# Patient Record
Sex: MALE | Race: WHITE | NOT HISPANIC OR LATINO | Employment: FULL TIME | URBAN - METROPOLITAN AREA
[De-identification: names, ages, dates, MRNs, and addresses within clinical notes are randomized per-mention and may not be internally consistent; named-entity substitution may affect disease eponyms.]

---

## 2018-04-24 ENCOUNTER — OFFICE VISIT CONVERTED (OUTPATIENT)
Dept: SURGERY | Facility: CLINIC | Age: 38
End: 2018-04-24
Attending: PHYSICIAN ASSISTANT

## 2019-09-10 ENCOUNTER — HOSPITAL ENCOUNTER (OUTPATIENT)
Dept: MRI IMAGING | Facility: HOSPITAL | Age: 39
Discharge: HOME | End: 2019-09-10
Attending: INTERNAL MEDICINE

## 2019-09-18 ENCOUNTER — OFFICE VISIT CONVERTED (OUTPATIENT)
Dept: SURGERY | Facility: CLINIC | Age: 39
End: 2019-09-18
Attending: SURGERY

## 2019-10-04 ENCOUNTER — HOSPITAL ENCOUNTER (OUTPATIENT)
Dept: GASTROENTEROLOGY | Facility: HOSPITAL | Age: 39
Setting detail: HOSPITAL OUTPATIENT SURGERY
Discharge: HOME | End: 2019-10-04
Attending: SURGERY

## 2019-10-16 ENCOUNTER — OFFICE VISIT CONVERTED (OUTPATIENT)
Dept: SURGERY | Facility: CLINIC | Age: 39
End: 2019-10-16
Attending: SURGERY

## 2019-10-16 ENCOUNTER — CONVERSION ENCOUNTER (OUTPATIENT)
Dept: SURGERY | Facility: CLINIC | Age: 39
End: 2019-10-16

## 2019-10-21 ENCOUNTER — HOSPITAL ENCOUNTER (OUTPATIENT)
Dept: ULTRASOUND IMAGING | Facility: HOSPITAL | Age: 39
Discharge: HOME | End: 2019-10-21
Attending: SURGERY

## 2019-10-23 ENCOUNTER — OFFICE VISIT CONVERTED (OUTPATIENT)
Dept: SURGERY | Facility: CLINIC | Age: 39
End: 2019-10-23
Attending: SURGERY

## 2019-10-23 ENCOUNTER — CONVERSION ENCOUNTER (OUTPATIENT)
Dept: SURGERY | Facility: CLINIC | Age: 39
End: 2019-10-23

## 2019-11-01 ENCOUNTER — HOSPITAL ENCOUNTER (OUTPATIENT)
Dept: PERIOP | Facility: HOSPITAL | Age: 39
Setting detail: HOSPITAL OUTPATIENT SURGERY
Discharge: HOME | End: 2019-11-01
Attending: SURGERY

## 2019-11-04 ENCOUNTER — HOSPITAL ENCOUNTER (OUTPATIENT)
Dept: OTHER | Facility: HOSPITAL | Age: 39
Discharge: HOME | End: 2019-11-04
Attending: FAMILY MEDICINE

## 2019-11-04 LAB
ALBUMIN SERPL-MCNC: 4.3 G/DL (ref 3.5–5)
ALBUMIN/GLOB SERPL: 1.6 {RATIO} (ref 1.4–2.6)
ALP SERPL-CCNC: 48 U/L (ref 53–128)
ALT SERPL-CCNC: 17 U/L (ref 10–40)
ANION GAP SERPL CALC-SCNC: 14 MMOL/L (ref 8–19)
AST SERPL-CCNC: 14 U/L (ref 15–50)
BASOPHILS # BLD AUTO: 0.06 10*3/UL (ref 0–0.2)
BASOPHILS NFR BLD AUTO: 0.8 % (ref 0–3)
BILIRUB SERPL-MCNC: 0.44 MG/DL (ref 0.2–1.3)
BUN SERPL-MCNC: 15 MG/DL (ref 5–25)
BUN/CREAT SERPL: 18 {RATIO} (ref 6–20)
CALCIUM SERPL-MCNC: 9.8 MG/DL (ref 8.7–10.4)
CHLORIDE SERPL-SCNC: 99 MMOL/L (ref 99–111)
CONV ABS IMM GRAN: 0.02 10*3/UL (ref 0–0.2)
CONV CO2: 27 MMOL/L (ref 22–32)
CONV IMMATURE GRAN: 0.3 % (ref 0–1.8)
CONV TOTAL PROTEIN: 7 G/DL (ref 6.3–8.2)
CORTIS PM SERPL-MCNC: 7.1 UG/DL (ref 2.7–10.5)
CREAT UR-MCNC: 0.85 MG/DL (ref 0.7–1.2)
DEPRECATED RDW RBC AUTO: 44.3 FL (ref 35.1–43.9)
EOSINOPHIL # BLD AUTO: 0.22 10*3/UL (ref 0–0.7)
EOSINOPHIL # BLD AUTO: 2.9 % (ref 0–7)
ERYTHROCYTE [DISTWIDTH] IN BLOOD BY AUTOMATED COUNT: 13.7 % (ref 11.6–14.4)
GFR SERPLBLD BASED ON 1.73 SQ M-ARVRAT: >60 ML/MIN/{1.73_M2}
GLOBULIN UR ELPH-MCNC: 2.7 G/DL (ref 2–3.5)
GLUCOSE SERPL-MCNC: 82 MG/DL (ref 70–99)
HCT VFR BLD AUTO: 42.7 % (ref 42–52)
HGB BLD-MCNC: 13.6 G/DL (ref 14–18)
LYMPHOCYTES # BLD AUTO: 2.51 10*3/UL (ref 1–5)
LYMPHOCYTES NFR BLD AUTO: 33.3 % (ref 20–45)
MCH RBC QN AUTO: 28 PG (ref 27–31)
MCHC RBC AUTO-ENTMCNC: 31.9 G/DL (ref 33–37)
MCV RBC AUTO: 87.9 FL (ref 80–96)
MONOCYTES # BLD AUTO: 0.82 10*3/UL (ref 0.2–1.2)
MONOCYTES NFR BLD AUTO: 10.9 % (ref 3–10)
NEUTROPHILS # BLD AUTO: 3.9 10*3/UL (ref 2–8)
NEUTROPHILS NFR BLD AUTO: 51.8 % (ref 30–85)
NRBC CBCN: 0 % (ref 0–0.7)
OSMOLALITY SERPL CALC.SUM OF ELEC: 282 MOSM/KG (ref 273–304)
PLATELET # BLD AUTO: 260 10*3/UL (ref 130–400)
PMV BLD AUTO: 10 FL (ref 9.4–12.4)
POTASSIUM SERPL-SCNC: 4.4 MMOL/L (ref 3.5–5.3)
RBC # BLD AUTO: 4.86 10*6/UL (ref 4.7–6.1)
SODIUM SERPL-SCNC: 136 MMOL/L (ref 135–147)
TSH SERPL-ACNC: 1.97 M[IU]/L (ref 0.27–4.2)
WBC # BLD AUTO: 7.53 10*3/UL (ref 4.8–10.8)

## 2019-11-05 ENCOUNTER — HOSPITAL ENCOUNTER (OUTPATIENT)
Dept: OTHER | Facility: HOSPITAL | Age: 39
Discharge: HOME | End: 2019-11-05
Attending: FAMILY MEDICINE

## 2019-11-05 LAB — CORTIS AM PEAK SERPL-MCNC: 14.2 UG/DL (ref 6–18.4)

## 2019-11-14 ENCOUNTER — CONVERSION ENCOUNTER (OUTPATIENT)
Dept: SURGERY | Facility: CLINIC | Age: 39
End: 2019-11-14

## 2019-11-14 ENCOUNTER — OFFICE VISIT CONVERTED (OUTPATIENT)
Dept: SURGERY | Facility: CLINIC | Age: 39
End: 2019-11-14
Attending: SURGERY

## 2020-07-06 ENCOUNTER — HOSPITAL ENCOUNTER (OUTPATIENT)
Dept: FAMILY MEDICINE CLINIC | Facility: CLINIC | Age: 40
Discharge: HOME | End: 2020-07-06
Attending: FAMILY MEDICINE

## 2020-09-14 ENCOUNTER — HOSPITAL ENCOUNTER (OUTPATIENT)
Dept: ULTRASOUND IMAGING | Facility: HOSPITAL | Age: 40
Discharge: HOME | End: 2020-09-14
Attending: INTERNAL MEDICINE

## 2021-02-04 ENCOUNTER — OFFICE VISIT CONVERTED (OUTPATIENT)
Dept: UROLOGY | Facility: CLINIC | Age: 41
End: 2021-02-04
Attending: NURSE PRACTITIONER

## 2021-03-18 ENCOUNTER — TELEPHONE CONVERTED (OUTPATIENT)
Dept: UROLOGY | Facility: CLINIC | Age: 41
End: 2021-03-18
Attending: NURSE PRACTITIONER

## 2021-05-10 NOTE — H&P
History and Physical      Patient Name: Arash Torres   Patient ID: [id number]   Sex: Male   YOB: 1980    Primary Care Provider: Saúl Muñoz MD    Visit Date: February 4, 2021    Provider: GUILHERME Kline   Location: [facility] [place] [facility]   Location Address: [address] [place] [address]  [id number]   Location Phone: [phone]          Chief Complaint  · PT HERE FOR UROLOGICAL CONCERNS      History Of Present Illness  The patient is a 40 year old /White male , who presents for follow up on on chronic prostatitis   The patient's  related symptoms include perineal discomfort, hematospermia, perineal pain, and slowing of his stream which first began approximately 1 month ago. The symptoms were gradual in onset and are moderate in severity. The symptoms were not related to voiding.      uses a massager to perineal area to relieve pain    he states pain relief after ejaculation.    Patient with a  history of prostatitis and seminal vasculitis     [facility] has seen [facility] as well as our urology group and been on many rounds of antibioitcs for prostatitis.       Past Medical History  Acromegaly; Allergies; Anxiety; Bipolar disorder; Depression; Hemorrhoids; High blood pressure; Jaundice; Migraine Headaches; Pituitary abnormality; Prostate Disorder         Past Surgical History  Appendectomy; Cholecystectomy; EGD; Pituitary Gland         Allergy List  PENICILLINS       Allergies Reconciled  Family Medical History  Stroke; Cancer, Unspecified; Diabetes, unspecified type; Family history of breast cancer; Diabetes         Social History  Alcohol (Current some day); Caffeine (Current every day); Second hand smoke exposure (Never); Tobacco (Never)         Review of Systems  · Constitutional  o Denies  o : fever, chills  · Cardiovascular  o Denies  o : chest pain, cardiac murmurs, irregular heart beats, dyspnea on exertion  · Respiratory  o Denies  o : shortness of breath,  wheezing  · Gastrointestinal  o Denies  o : nausea, vomiting, change in abdominal girth, diarrhea, constipation, blood in stools  · Genitourinary  o Denies  o : urgency, frequency, dysuria, nocturia  · Integument  o Denies  o : rash, itching, new skin lesions  · Neurologic  o Denies  o : tingling or numbness, incoordination, seizures  · Endocrine  o Denies  o : polyuria, polydipsia, cold intolerance, heat intolerance, weight gain, weight loss  · Psychiatric  o Denies  o : anxiety, depression  · Heme-Lymph  o Denies  o : easy bleeding, easy bruising, lymph node enlargement or tenderness      Vitals  Date Time BP Position Site L\R Cuff Size HR RR TEMP (F) WT  HT  BMI kg/m2 BSA m2 O2 Sat FR L/min FiO2 HC       02/04/2021 10:49 AM       16  274lbs 8oz 6'   37.23 2.51             Physical Examination  · Constitutional  o Appearance  o : Well nourished, well developed patient in no acute distress. Ambulating without difficulty.  · Head and Face  o Head  o :   § Inspection  § : atraumatic, normocephalic  o Face  o :   § Inspection  § : no facial lesions  · Eyes  o Conjunctivae  o : conjunctivae normal  o Sclerae  o : sclerae white  · Neck  o Inspection/Palpation  o : normal appearance, no masses or tenderness, trachea midline  · Respiratory  o Respiratory Effort  o : Breathing is unlabored without accessory muscle use  · Skin and Subcutaneous Tissue  o General Inspection  o : No rashes, lesions or areas of discoloration present. Skin turgor is normal.  o General Palpation  o : No abnormalities, masses or tenderness on palpation.  · Neurologic  o Mental Status Examination  o :   § Orientation  § : grossly oriented to person, place and time  § Attention  § : attention normal, concentration abilities normal  § Fund of Knowledge  § : fund of knowledge within normal limits, patient aware of current events  o Gait and Station  o : normal gait, able to stand without difficulty  · Psychiatric  o Judgement and Insight  o : judgment  and insight intact, judgement for everyday activities and social situations within normal limits, insight intact  o Mood and Affect  o : mood normal, affect appropriate          Results  · In-Office Procedures  o Lab procedure  § Automated dipstick urinalysis with microscopy (07278)   § Color Ur: Yellow   § Clarity Ur: Clear   § Glucose Ur Ql Strip: Negative   § Bilirub Ur Ql Strip: Negative   § Ketones Ur Ql Strip: Negative   § Sp Gr Ur Qn: 1.025   § Hgb Ur Ql Strip: Negative   § pH Ur-LsCnc: 6.0   § Prot Ur Ql Strip: Negative   § Urobilinogen Ur Strip-mCnc: 0.2   § Nitrite Ur Ql Strip: Negative   § WBC Est Ur Ql Strip: Negative   § RBC UrnS Qn HPF: 0   § WBC UrnS Qn HPF: 0   § Bacteria UrnS Qn HPF: 0   § Crystals UrnS Qn HPF: 0   § Epithelial Cells (non renal): 0 /HPF  § Epithelial Cells (renal): 0       Assessment  · Chronic prostatitis     601.1/N41.1  · Hematospermia     608.82/R36.1      Plan  · Medications  o doxycycline hyclate 100 mg oral capsule   SIG: take 1 capsule (100 mg) by oral route 2 times per day for 28 days   DISP: (56) Capsule with 0 refills  Prescribed on 02/04/2021     o Florajen3 460 mg (7.5-6- 1.5 bill. cell) oral capsule   SIG: take 1 capsule by oral route 2 times a day for 28 days   DISP: (56) Capsule with 0 refills  Prescribed on 02/04/2021     o tamsulosin 0.4 mg oral capsule   SIG: take 1 capsule (0.4 mg) by oral route once daily 1/2 hour following the same meal each day for 30 days   DISP: (30) Capsule with 0 refills  Prescribed on 02/04/2021     o Medications have been Reconciled  o Transition of Care or Provider Policy  · Instructions  o DISCUSSION:   o I discussed the diagnosis of prostatitis including the etiologies, complications, management and treatment options.  o PLAN: We will treat patient for prostatitis and follow-up in 8 weeks to see how his symptoms are.  o Instruction on treatment: Sitz baths or tub soaks, bed rest.  o Start antibiotic therapy with discussion of the  benefits and risks of therapy.  o Electronically Identified Patient Education Materials Provided Electronically            Electronically Signed by: GUILHERME Kline -Author on February 4, 2021 03:26:26 PM

## 2021-05-14 VITALS — WEIGHT: 274.5 LBS | HEIGHT: 72 IN | BODY MASS INDEX: 37.18 KG/M2 | RESPIRATION RATE: 16 BRPM

## 2021-05-14 NOTE — PROGRESS NOTES
Quick Note      Patient Name: Arash Torres   Patient ID: [id number]   Sex: Male   YOB: 1980    Primary Care Provider: Saúl Muñoz MD    Visit Date: March 18, 2021    Provider: GUILHERME Kline   Location: [facility] [place] [facility]   Location Address: [address] [place] [address]  [id number]   Location Phone: [phone]          History Of Present Illness  TELEHEALTH TELEPHONE VISIT  Arash Torres is a 40 year old /White male who is presenting for evaluation via telehealth telephone visit. Verbal consent obtained before beginning visit.   Provider spent 13 minutes with the patient during the telehealth visit.   The following staff were present during this visit: Heather Baxter RN, Kellee VANEGAS   Past Medical History/ Overview of Patient Symptoms     Called patient to discuss symptoms to date.    he has completed his antibiotic therapy.    States that after week 3 he felt as if the pain got significantly better and his lower urinary tract symptoms had improved.    He states that since stopping the antibiotic therapy the pain has returned however it is not as intense as it was previously he is still using a massager to his perineum.    He states that he has nocturia up to 6 times a night.    He states that the tamsulosin felt like it was helping at first but eventually kind of stopped helping him and that he could not tolerate the sexual side effects and when he further elaborated on this it appears that it caused some retrograde ejaculation which we discussed however the patient did not like the side effect.           Assessment  · Prostadynia     602.8/N42.81  · Prostatitis     601.9/N41.9      Plan  · Orders  o Physician Telephone Evaluation, 11-20 minutes (61619) - 602.8/N42.81, 601.9/N41.9 - 03/18/2021  · Medications  o Bactrim -160 mg oral tablet   SIG: take 1 tablet by oral route every 12 hours for 120 days   DISP: (240) Tablet with 0 refills  Prescribed on  03/18/2021     o Medications have been Reconciled  o Transition of Care or Provider Policy  · Instructions  o Plan Of Care: WILL TRIAL LONGER COURSE OF ANTIBIOITC THERAPY AND FOLLOW UP WITH HIM IN 4 MONTHS TO SEE IF THIS LONGER COURSE OF ANTIBIOTIC THERAPY WILL ALLEVIATE HIS SYMPTOMS. No relief of symptoms after this 4-month course of antibiotics then we will consider a post perineal massage urine culture and semen cultures to see if this is bacterial related. If negative cultures are returned then we will consider sending patient to pelvic floor physical therapy for chronic pelvic pain.            Electronically Signed by: GUILHERME Kline -Author on March 19, 2021 08:36:12 AM

## 2021-05-15 VITALS — BODY MASS INDEX: 31.88 KG/M2 | WEIGHT: 270 LBS | RESPIRATION RATE: 16 BRPM | HEIGHT: 77 IN

## 2021-05-15 VITALS — BODY MASS INDEX: 36.44 KG/M2 | HEIGHT: 72 IN | RESPIRATION RATE: 14 BRPM | WEIGHT: 269 LBS

## 2021-05-15 VITALS — HEIGHT: 77 IN | WEIGHT: 270 LBS | RESPIRATION RATE: 16 BRPM | BODY MASS INDEX: 31.88 KG/M2

## 2021-05-15 VITALS — BODY MASS INDEX: 31.88 KG/M2 | HEIGHT: 77 IN | RESPIRATION RATE: 16 BRPM | WEIGHT: 270 LBS

## 2021-05-16 VITALS — BODY MASS INDEX: 34.86 KG/M2 | WEIGHT: 295.25 LBS | HEIGHT: 77 IN | RESPIRATION RATE: 12 BRPM

## 2021-08-13 ENCOUNTER — OFFICE VISIT (OUTPATIENT)
Dept: UROLOGY | Facility: CLINIC | Age: 41
End: 2021-08-13

## 2021-08-13 VITALS — BODY MASS INDEX: 32.42 KG/M2 | WEIGHT: 274.6 LBS | HEIGHT: 77 IN | RESPIRATION RATE: 16 BRPM

## 2021-08-13 DIAGNOSIS — R36.1 HEMATOSPERMIA: ICD-10-CM

## 2021-08-13 DIAGNOSIS — N42.83 CYST OF PROSTATE: ICD-10-CM

## 2021-08-13 DIAGNOSIS — N40.2 PROSTATE NODULE: Primary | ICD-10-CM

## 2021-08-13 PROBLEM — E23.7 PITUITARY ABNORMALITY: Status: ACTIVE | Noted: 2021-08-13

## 2021-08-13 PROBLEM — F41.9 ANXIETY: Status: ACTIVE | Noted: 2021-08-13

## 2021-08-13 PROBLEM — K64.9 HEMORRHOIDS: Status: ACTIVE | Noted: 2021-08-13

## 2021-08-13 PROBLEM — N42.9 PROSTATE DISORDER: Status: ACTIVE | Noted: 2021-08-13

## 2021-08-13 PROBLEM — I10 HIGH BLOOD PRESSURE: Status: ACTIVE | Noted: 2021-08-13

## 2021-08-13 PROBLEM — F31.9 BIPOLAR DISORDER: Status: ACTIVE | Noted: 2021-08-13

## 2021-08-13 PROBLEM — J01.80 OTHER ACUTE SINUSITIS: Status: ACTIVE | Noted: 2021-08-13

## 2021-08-13 PROBLEM — R17 JAUNDICE: Status: ACTIVE | Noted: 2021-08-13

## 2021-08-13 LAB
BILIRUB BLD-MCNC: NEGATIVE MG/DL
CLARITY, POC: CLEAR
COLOR UR: YELLOW
GLUCOSE UR STRIP-MCNC: NEGATIVE MG/DL
KETONES UR QL: ABNORMAL
LEUKOCYTE EST, POC: NEGATIVE
NITRITE UR-MCNC: NEGATIVE MG/ML
PH UR: 6 [PH] (ref 5–8)
PROT UR STRIP-MCNC: NEGATIVE MG/DL
RBC # UR STRIP: NEGATIVE /UL
SP GR UR: 1.03 (ref 1–1.03)
UROBILINOGEN UR QL: NORMAL

## 2021-08-13 PROCEDURE — 99213 OFFICE O/P EST LOW 20 MIN: CPT | Performed by: NURSE PRACTITIONER

## 2021-08-13 PROCEDURE — 81003 URINALYSIS AUTO W/O SCOPE: CPT | Performed by: NURSE PRACTITIONER

## 2021-08-13 RX ORDER — MULTIPLE VITAMINS W/ MINERALS TAB 9MG-400MCG
1 TAB ORAL DAILY
COMMUNITY

## 2021-08-13 RX ORDER — LORATADINE 10 MG/1
CAPSULE, LIQUID FILLED ORAL
COMMUNITY
End: 2023-03-06

## 2021-08-13 RX ORDER — LISINOPRIL AND HYDROCHLOROTHIAZIDE 12.5; 1 MG/1; MG/1
TABLET ORAL
COMMUNITY
End: 2023-03-06

## 2021-08-13 RX ORDER — DIPHENHYDRAMINE HCL 25 MG
TABLET ORAL
COMMUNITY
End: 2023-03-06

## 2021-08-13 RX ORDER — MONTELUKAST SODIUM 10 MG/1
TABLET ORAL
COMMUNITY
End: 2023-03-06

## 2021-08-13 NOTE — PROGRESS NOTES
Chief Complaint: Follow-up (4 month prostate)    Subjective         History of Present Illness  Arash Torres is a 41 y.o. male presents to [facility] to be seen for 4-month follow-up on prostatitis.    Patient has completed a 4-month trial of antibiotic therapy for chronic prostatitis.    He states that the first 3 months of antibiotic therapy everything felt better.     He reports that during the 4th month of antibiotic therapy he began having post void dribble and hematospermia as well as urinary urgency and frequency again.     Using OTC prostate med.     He has been doing pelvic floor exercises and back pain is somewhat alleviated.     Reports that he has a history of a prostate cyst.       Objective     Past Medical History:   Diagnosis Date   • Acromegaly (CMS/HCC)    • Allergies    • Anxiety    • Bipolar disorder (CMS/HCC)    • Depression    • Hemorrhoids    • High blood pressure    • Jaundice    • Migraine headache    • Pituitary abnormality (CMS/HCC)    • Prostate disorder        Past Surgical History:   Procedure Laterality Date   • APPENDECTOMY     • CHOLECYSTECTOMY     • ENDOSCOPY     • PITUITARY EXCISION  2006    pituitary tumor         Current Outpatient Medications:   •  diphenhydrAMINE (Benadryl Allergy) 25 MG tablet, Benadryl Allergy 25 mg oral tablet take 1 tablet by oral route 3 times a day as needed   Active, Disp: , Rfl:   •  lisinopril-hydrochlorothiazide (PRINZIDE,ZESTORETIC) 10-12.5 MG per tablet, lisinopril-hydrochlorothiazide 10-12.5 mg oral tablet take 1 tablet by oral route once daily   Suspended, Disp: , Rfl:   •  Loratadine (Claritin) 10 MG capsule, Take  by mouth., Disp: , Rfl:   •  montelukast (Singulair) 10 MG tablet, Singulair 10 mg oral tablet take 1 tablet (10 mg) by oral route once daily in the evening   Active, Disp: , Rfl:   •  multivitamin with minerals (MULTIVITAMIN ADULT PO), Take 1 tablet by mouth Daily., Disp: , Rfl:     Allergies   Allergen  "Reactions   • Penicillins Other (See Comments)     Been told since he was a child not to take        Family History   Problem Relation Age of Onset   • Stroke Mother    • Cancer Mother    • Diabetes Mother    • Breast cancer Mother         60s   • Diabetes Maternal Grandmother    • Diabetes Maternal Aunt    • Diabetes Maternal Uncle    • Breast cancer Other         50s       Social History     Socioeconomic History   • Marital status: Single     Spouse name: Not on file   • Number of children: Not on file   • Years of education: Not on file   • Highest education level: Not on file   Tobacco Use   • Smoking status: Never Smoker   • Smokeless tobacco: Never Used   Vaping Use   • Vaping Use: Never used   Substance and Sexual Activity   • Alcohol use: Yes     Comment: drinks weekly   • Drug use: Never   • Sexual activity: Defer       Vital Signs:   Resp 16   Ht 195.6 cm (77\")   Wt 125 kg (274 lb 9.6 oz)   BMI 32.56 kg/m²      Physical Exam     Result Review :   The following data was reviewed by: GUILHERME Dunn on 08/13/2021:  Results for orders placed or performed in visit on 08/13/21   POC Urinalysis Dipstick, Automated    Specimen: Urine   Result Value Ref Range    Color Yellow Yellow, Straw, Dark Yellow, Irma    Clarity, UA Clear Clear    Specific Gravity  1.030 1.005 - 1.030    pH, Urine 6.0 5.0 - 8.0    Leukocytes Negative Negative    Nitrite, UA Negative Negative    Protein, POC Negative Negative mg/dL    Glucose, UA Negative Negative, 1000 mg/dL (3+) mg/dL    Ketones, UA Trace (A) Negative    Urobilinogen, UA Normal Normal    Bilirubin Negative Negative    Blood, UA Negative Negative          Procedures        Assessment and Plan    Diagnoses and all orders for this visit:    1. Prostate nodule (Primary)  -     POC Urinalysis Dipstick, Automated    2. Cyst of prostate  -     MRI Prostate With & Without Contrast; Future    3. Hematospermia  -     MRI Prostate With & Without Contrast; Future  -     " Body Fluid Culture - Body Fluid, Prostate; Future        Patient will perform a semen culture and have MRI  Scheduled  Of the prostate. We will f/u in 4 weeks or sooner if needed. We will treat as appropriate when semen culture returns.      I spent 10 minutes caring for Arash on this date of service. This time includes time spent by me in the following activities:reviewing tests, obtaining and/or reviewing a separately obtained history, performing a medically appropriate examination and/or evaluation , counseling and educating the patient/family/caregiver, ordering medications, tests, or procedures, and documenting information in the medical record  Follow Up   Return in about 4 weeks (around 9/10/2021) for f/u semen CX .  Patient was given instructions and counseling regarding his condition or for health maintenance advice. Please see specific information pulled into the AVS if appropriate.         This document has been electronically signed by GUILHERME Dunn  August 13, 2021 15:57 EDT

## 2021-08-18 ENCOUNTER — TELEPHONE (OUTPATIENT)
Dept: SURGERY | Facility: CLINIC | Age: 41
End: 2021-08-18

## 2021-09-27 ENCOUNTER — DOCUMENTATION (OUTPATIENT)
Dept: UROLOGY | Facility: CLINIC | Age: 41
End: 2021-09-27

## 2021-09-27 NOTE — PROGRESS NOTES
Called patient to discuss results of MRI of the prostate.  MRI of the prostate performed on 9/18/2021 reveals no suspicious lesions per PI-RADS criteria.  Diffuse ill-defined areas of T2 hypointensity throughout the peripheral zone suggesting sequela prostatitis.  Subcentimeter prostatic utricle cyst.  Discussed with patient at this point in time given that prostatitis was seen on MRI a semen culture would definitively tell us whether or not this is bacterial prostatitis and if we been treating this appropriately or if he is with some resistance to antibiotics given multiple treatments for prostatitis.  We will have him perform semen culture and have him follow-up with Dr. Kayla Garcia.

## 2021-09-28 ENCOUNTER — TELEPHONE (OUTPATIENT)
Dept: UROLOGY | Facility: CLINIC | Age: 41
End: 2021-09-28

## 2021-09-28 DIAGNOSIS — R36.1 HEMATOSPERMIA: ICD-10-CM

## 2021-09-28 PROCEDURE — 87205 SMEAR GRAM STAIN: CPT | Performed by: NURSE PRACTITIONER

## 2021-09-28 PROCEDURE — 87070 CULTURE OTHR SPECIMN AEROBIC: CPT | Performed by: NURSE PRACTITIONER

## 2021-10-01 LAB
BACTERIA FLD CULT: NORMAL
GRAM STN SPEC: NORMAL
GRAM STN SPEC: NORMAL

## 2021-11-01 ENCOUNTER — TRANSCRIBE ORDERS (OUTPATIENT)
Dept: ADMINISTRATIVE | Facility: HOSPITAL | Age: 41
End: 2021-11-01

## 2021-11-01 DIAGNOSIS — E04.1 THYROID NODULE: Primary | ICD-10-CM

## 2021-11-22 ENCOUNTER — OFFICE VISIT (OUTPATIENT)
Dept: UROLOGY | Facility: CLINIC | Age: 41
End: 2021-11-22

## 2021-11-22 VITALS
BODY MASS INDEX: 33.75 KG/M2 | DIASTOLIC BLOOD PRESSURE: 75 MMHG | HEIGHT: 77 IN | SYSTOLIC BLOOD PRESSURE: 151 MMHG | WEIGHT: 285.8 LBS

## 2021-11-22 DIAGNOSIS — N42.9 PROSTATE DISORDER: Primary | ICD-10-CM

## 2021-11-22 DIAGNOSIS — N41.1 CHRONIC PROSTATITIS: ICD-10-CM

## 2021-11-22 LAB
BILIRUB BLD-MCNC: NEGATIVE MG/DL
CLARITY, POC: CLEAR
COLOR UR: YELLOW
EXPIRATION DATE: NORMAL
GLUCOSE UR STRIP-MCNC: NEGATIVE MG/DL
KETONES UR QL: NEGATIVE
LEUKOCYTE EST, POC: NEGATIVE
Lab: NORMAL
NITRITE UR-MCNC: NEGATIVE MG/ML
PH UR: 6 [PH] (ref 5–8)
PROT UR STRIP-MCNC: NEGATIVE MG/DL
RBC # UR STRIP: NEGATIVE /UL
SP GR UR: 1.02 (ref 1–1.03)
UROBILINOGEN UR QL: NORMAL

## 2021-11-22 PROCEDURE — 81003 URINALYSIS AUTO W/O SCOPE: CPT | Performed by: UROLOGY

## 2021-11-22 PROCEDURE — 99213 OFFICE O/P EST LOW 20 MIN: CPT | Performed by: UROLOGY

## 2021-11-22 RX ORDER — IBUPROFEN 800 MG/1
800 TABLET ORAL EVERY 8 HOURS
Qty: 42 TABLET | Refills: 0 | Status: SHIPPED | OUTPATIENT
Start: 2021-11-22 | End: 2021-12-06

## 2021-11-22 NOTE — PROGRESS NOTES
"Chief Complaint  Follow-up (prostatitis)    Subjective          Verbal consent obtained for this recording.    Arash Torres presents to [facility]  History of Present Illness    Mr. Torres is a gentleman who has been seen by GUILHERME Kline for prostatitis. He saw her and has been treated for prostatitis a few times. He did a 4-month trial of antibiotic therapy in the past and felt better for the first 3 months, but then started having pain again. He has also been doing some pelvic floor exercises and states that helps somewhat. He had a prostate MRI ordered by GUILHERME Kline in 09/2021 which revealed no suspicious lesions per PI-RADS criteria. He did have diffuse ill-defined areas of T2 hypointensity suggesting chronic prostatitis. He also had a small prostatic utricle cyst that was less than 1 cm in size. He also had a seminal fluid or semen culture done which was negative. There was no growth seen at 3 days and just rare WBCs.    He reports that he is doing good. He denies taking any anti-inflammatories and states that he does not like taking a bunch of medication. He reports that lately for approximately 2 to 3 weeks he has been having back pain and \"everything down there\" was hurting. He states that he has a \"regimen\" and notes that when he does not have sex he can feel a pressure \"down there.\" He states that he only sees his girlfriend once a week. He states that 2 to 3 times a week he will \"relieve the pressure.\"  He states that last week he did not do anything and everything felt better.     He reports that he has tried Flomax in the past and it helped with urination, but states it \"kind of took the orgasm out of orgasm.\" He states that he took Flomax for 3 months.  He reports that when he is up moving around after a few hours at work his urine flow is great.    He states that during the summer he ate a lot of tomatoes. He states that he has not been able to eat spicy " "stuff due to his gallbladder being taken out.    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 He states that it is hard to commit to the physical therapy because it is hard for him to take off from work.    Objective   Vital Signs:   /75   Ht 195.6 cm (77\")   Wt 130 kg (285 lb 12.8 oz)   BMI 33.89 kg/m²     Physical Exam  Vitals and nursing note reviewed.   Constitutional:       Appearance: Normal appearance. He is well-developed.   Pulmonary:      Effort: Pulmonary effort is normal.      Breath sounds: Normal air entry.   Neurological:      Mental Status: He is alert and oriented to person, place, and time.      Motor: Motor function is intact.   Psychiatric:         Mood and Affect: Mood normal.         Behavior: Behavior normal.        Result Review :       Data reviewed: MRI prostate, semen culture           Results for orders placed or performed in visit on 11/22/21   POC Urinalysis Dipstick, Automated    Specimen: Urine   Result Value Ref Range    Color Yellow Yellow, Straw, Dark Yellow, Irma    Clarity, UA Clear Clear    Specific Gravity  1.020 1.005 - 1.030    pH, Urine 6.0 5.0 - 8.0    Leukocytes Negative Negative    Nitrite, UA Negative Negative    Protein, POC Negative Negative mg/dL    Glucose, UA Negative Negative, 1000 mg/dL (3+) mg/dL    Ketones, UA Negative Negative    Urobilinogen, UA Normal Normal    Bilirubin Negative Negative    Blood, UA Negative Negative    Lot Number [id number]     Expiration Date 11/30/22        Assessment and Plan    Diagnoses and all orders for this visit:    1. Prostate disorder (Primary)  -     POC Urinalysis Dipstick, Automated  -     ibuprofen (ADVIL,MOTRIN) 800 MG tablet; Take 1 tablet by mouth Every 8 (Eight) Hours for 14 days.  Dispense: 42 tablet; Refill: 0  -     Ambulatory Referral to Physical Therapy Evaluate and treat (Male Pelvic Floor Rehab)    2. Chronic prostatitis  -     ibuprofen (ADVIL,MOTRIN) 800 MG tablet; Take 1 tablet by mouth Every " 8 (Eight) Hours for 14 days.  Dispense: 42 tablet; Refill: 0  -     Ambulatory Referral to Physical Therapy Evaluate and treat (Male Pelvic Floor Rehab)      Chronic prostatitis  - We will try anti-inflammatories, ibuprofen 800 mg.  - We will also refer him to pelvic floor physical therapy      Follow Up   Return in about 4 weeks (around 12/20/2021).  Patient was given instructions and counseling regarding his condition or for health maintenance advice. Please see specific information pulled into the AVS if appropriate.     Transcribed from ambient dictation for Kayla Garcia MD by Aida Payton.  11/22/21   18:05 EST    Patient verbalized consent to the visit recording.  I have personally performed the services described in this document as transcribed by the above individual, and it is both accurate and complete.  Kayla Garcia MD  11/22/2021  21:19 EST

## 2022-02-02 ENCOUNTER — APPOINTMENT (OUTPATIENT)
Dept: ULTRASOUND IMAGING | Facility: HOSPITAL | Age: 42
End: 2022-02-02

## 2022-02-03 ENCOUNTER — TRANSCRIBE ORDERS (OUTPATIENT)
Dept: LAB | Facility: HOSPITAL | Age: 42
End: 2022-02-03

## 2022-02-03 ENCOUNTER — LAB (OUTPATIENT)
Dept: LAB | Facility: HOSPITAL | Age: 42
End: 2022-02-03

## 2022-02-03 DIAGNOSIS — E11.00 TYPE II DIABETES MELLITUS WITH HYPEROSMOLARITY, UNCONTROLLED: ICD-10-CM

## 2022-02-03 DIAGNOSIS — E78.2 MIXED HYPERLIPIDEMIA: ICD-10-CM

## 2022-02-03 DIAGNOSIS — E11.00 TYPE II DIABETES MELLITUS WITH HYPEROSMOLARITY, UNCONTROLLED: Primary | ICD-10-CM

## 2022-02-03 DIAGNOSIS — I10 ESSENTIAL HYPERTENSION, MALIGNANT: ICD-10-CM

## 2022-02-03 DIAGNOSIS — E11.65 TYPE II DIABETES MELLITUS WITH HYPEROSMOLARITY, UNCONTROLLED: Primary | ICD-10-CM

## 2022-02-03 DIAGNOSIS — E11.65 TYPE II DIABETES MELLITUS WITH HYPEROSMOLARITY, UNCONTROLLED: ICD-10-CM

## 2022-02-03 LAB
ALBUMIN SERPL-MCNC: 4.6 G/DL (ref 3.5–5.2)
ALBUMIN/GLOB SERPL: 1.6 G/DL
ALP SERPL-CCNC: 60 U/L (ref 39–117)
ALT SERPL W P-5'-P-CCNC: 18 U/L (ref 1–41)
ANION GAP SERPL CALCULATED.3IONS-SCNC: 9.1 MMOL/L (ref 5–15)
AST SERPL-CCNC: 16 U/L (ref 1–40)
BASOPHILS # BLD AUTO: 0.04 10*3/MM3 (ref 0–0.2)
BASOPHILS NFR BLD AUTO: 0.6 % (ref 0–1.5)
BILIRUB SERPL-MCNC: 0.6 MG/DL (ref 0–1.2)
BUN SERPL-MCNC: 16 MG/DL (ref 6–20)
BUN/CREAT SERPL: 19 (ref 7–25)
CALCIUM SPEC-SCNC: 9.9 MG/DL (ref 8.6–10.5)
CHLORIDE SERPL-SCNC: 103 MMOL/L (ref 98–107)
CHOLEST SERPL-MCNC: 192 MG/DL (ref 0–200)
CO2 SERPL-SCNC: 24.9 MMOL/L (ref 22–29)
CREAT SERPL-MCNC: 0.84 MG/DL (ref 0.76–1.27)
DEPRECATED RDW RBC AUTO: 38.9 FL (ref 37–54)
EOSINOPHIL # BLD AUTO: 0.2 10*3/MM3 (ref 0–0.4)
EOSINOPHIL NFR BLD AUTO: 3 % (ref 0.3–6.2)
ERYTHROCYTE [DISTWIDTH] IN BLOOD BY AUTOMATED COUNT: 13 % (ref 12.3–15.4)
GFR SERPL CREATININE-BSD FRML MDRD: 102 ML/MIN/1.73
GLOBULIN UR ELPH-MCNC: 2.9 GM/DL
GLUCOSE SERPL-MCNC: 93 MG/DL (ref 65–99)
HCT VFR BLD AUTO: 41.1 % (ref 37.5–51)
HDLC SERPL-MCNC: 44 MG/DL (ref 40–60)
HGB BLD-MCNC: 14.3 G/DL (ref 13–17.7)
IMM GRANULOCYTES # BLD AUTO: 0.01 10*3/MM3 (ref 0–0.05)
IMM GRANULOCYTES NFR BLD AUTO: 0.1 % (ref 0–0.5)
LDLC SERPL CALC-MCNC: 113 MG/DL (ref 0–100)
LDLC/HDLC SERPL: 2.46 {RATIO}
LYMPHOCYTES # BLD AUTO: 2.01 10*3/MM3 (ref 0.7–3.1)
LYMPHOCYTES NFR BLD AUTO: 29.7 % (ref 19.6–45.3)
MCH RBC QN AUTO: 28.8 PG (ref 26.6–33)
MCHC RBC AUTO-ENTMCNC: 34.8 G/DL (ref 31.5–35.7)
MCV RBC AUTO: 82.7 FL (ref 79–97)
MONOCYTES # BLD AUTO: 0.63 10*3/MM3 (ref 0.1–0.9)
MONOCYTES NFR BLD AUTO: 9.3 % (ref 5–12)
NEUTROPHILS NFR BLD AUTO: 3.87 10*3/MM3 (ref 1.7–7)
NEUTROPHILS NFR BLD AUTO: 57.3 % (ref 42.7–76)
NRBC BLD AUTO-RTO: 0 /100 WBC (ref 0–0.2)
PLATELET # BLD AUTO: 262 10*3/MM3 (ref 140–450)
PMV BLD AUTO: 9.6 FL (ref 6–12)
POTASSIUM SERPL-SCNC: 4.3 MMOL/L (ref 3.5–5.2)
PROT SERPL-MCNC: 7.5 G/DL (ref 6–8.5)
RBC # BLD AUTO: 4.97 10*6/MM3 (ref 4.14–5.8)
SODIUM SERPL-SCNC: 137 MMOL/L (ref 136–145)
TRIGL SERPL-MCNC: 199 MG/DL (ref 0–150)
TSH SERPL DL<=0.05 MIU/L-ACNC: 0.86 UIU/ML (ref 0.27–4.2)
VLDLC SERPL-MCNC: 35 MG/DL (ref 5–40)
WBC NRBC COR # BLD: 6.76 10*3/MM3 (ref 3.4–10.8)

## 2022-02-03 PROCEDURE — 80061 LIPID PANEL: CPT

## 2022-02-03 PROCEDURE — 80050 GENERAL HEALTH PANEL: CPT

## 2022-02-03 PROCEDURE — 83036 HEMOGLOBIN GLYCOSYLATED A1C: CPT

## 2022-02-03 PROCEDURE — 82043 UR ALBUMIN QUANTITATIVE: CPT

## 2022-02-03 PROCEDURE — 36415 COLL VENOUS BLD VENIPUNCTURE: CPT

## 2022-02-04 LAB
ALBUMIN UR-MCNC: <1.2 MG/DL
HBA1C MFR BLD: 5.1 % (ref 4.8–5.6)

## 2022-05-12 ENCOUNTER — OFFICE VISIT (OUTPATIENT)
Dept: UROLOGY | Facility: CLINIC | Age: 42
End: 2022-05-12

## 2022-05-12 VITALS — WEIGHT: 282 LBS | HEIGHT: 77 IN | BODY MASS INDEX: 33.3 KG/M2

## 2022-05-12 DIAGNOSIS — N48.89 PENILE LUMP: ICD-10-CM

## 2022-05-12 DIAGNOSIS — N40.2 PROSTATE NODULE: Primary | ICD-10-CM

## 2022-05-12 PROBLEM — F32.A DEPRESSION: Status: ACTIVE | Noted: 2021-08-13

## 2022-05-12 LAB
BILIRUB BLD-MCNC: NEGATIVE MG/DL
CLARITY, POC: CLEAR
COLOR UR: YELLOW
EXPIRATION DATE: NORMAL
GLUCOSE UR STRIP-MCNC: NEGATIVE MG/DL
KETONES UR QL: NEGATIVE
LEUKOCYTE EST, POC: NEGATIVE
Lab: NORMAL
NITRITE UR-MCNC: NEGATIVE MG/ML
PH UR: 5.5 [PH] (ref 5–8)
PROT UR STRIP-MCNC: NEGATIVE MG/DL
RBC # UR STRIP: NEGATIVE /UL
SP GR UR: 1.02 (ref 1–1.03)
UROBILINOGEN UR QL: NORMAL

## 2022-05-12 PROCEDURE — 81003 URINALYSIS AUTO W/O SCOPE: CPT | Performed by: NURSE PRACTITIONER

## 2022-05-12 PROCEDURE — 99213 OFFICE O/P EST LOW 20 MIN: CPT | Performed by: NURSE PRACTITIONER

## 2022-05-12 RX ORDER — CYCLOBENZAPRINE HCL 10 MG
10 TABLET ORAL 3 TIMES DAILY PRN
COMMUNITY
End: 2023-03-06

## 2022-05-12 NOTE — PROGRESS NOTES
"Chief Complaint: penile lump    Subjective         History of Present Illness  Arash Torres is a 42 y.o. male presents to [facility] to be seen for .    He is seeing pelvic floor PT and this is helping with pain and urination.    He states hematospermia has reduced.     He reports a \"lump\" inside his penis that will wax and wane in size. It will be larger in the morning with erections and gets smaller as the day goes by.     He states that he noticed the lump during a \"flare\" of his prostate symptoms however the lump itself is not painful.     Objective     Past Medical History:   Diagnosis Date   • Acromegaly (HCC)    • Allergies    • Anxiety    • Bipolar disorder (HCC)    • Depression    • Hemorrhoids    • High blood pressure    • Jaundice    • Migraine headache    • Pituitary abnormality (HCC)    • Prostate disorder        Past Surgical History:   Procedure Laterality Date   • APPENDECTOMY     • CHOLECYSTECTOMY     • ENDOSCOPY     • PITUITARY EXCISION  2006    pituitary tumor         Current Outpatient Medications:   •  cyclobenzaprine (FLEXERIL) 10 MG tablet, Take 10 mg by mouth 3 (Three) Times a Day As Needed for Muscle Spasms., Disp: , Rfl:   •  diphenhydrAMINE (BENADRYL) 25 MG tablet, Benadryl Allergy 25 mg oral tablet take 1 tablet by oral route 3 times a day as needed   Active, Disp: , Rfl:   •  lisinopril-hydrochlorothiazide (PRINZIDE,ZESTORETIC) 10-12.5 MG per tablet, lisinopril-hydrochlorothiazide 10-12.5 mg oral tablet take 1 tablet by oral route once daily   Suspended, Disp: , Rfl:   •  Loratadine 10 MG capsule, Take  by mouth., Disp: , Rfl:   •  montelukast (SINGULAIR) 10 MG tablet, Singulair 10 mg oral tablet take 1 tablet (10 mg) by oral route once daily in the evening   Active, Disp: , Rfl:   •  multivitamin with minerals tablet tablet, Take 1 tablet by mouth Daily., Disp: , Rfl:     Allergies   Allergen Reactions   • Penicillins Other (See Comments)     Been told since " "he was a child not to take        Family History   Problem Relation Age of Onset   • Stroke Mother    • Cancer Mother    • Diabetes Mother    • Breast cancer Mother         60s   • Diabetes Maternal Grandmother    • Diabetes Maternal Aunt    • Diabetes Maternal Uncle    • Breast cancer Other         50s       Social History     Socioeconomic History   • Marital status: Single   Tobacco Use   • Smoking status: Never Smoker   • Smokeless tobacco: Never Used   Vaping Use   • Vaping Use: Never used   Substance and Sexual Activity   • Alcohol use: Yes     Comment: drinks weekly   • Drug use: Never   • Sexual activity: Defer       Vital Signs:   Ht 195.6 cm (77\")   Wt 128 kg (282 lb)   BMI 33.44 kg/m²      Physical Exam  Genitourinary:     Comments: Mid shaft BB sized firm mobile nodule feels to be adjacent to either the urethra or the corpus cavernosum.         Result Review :   The following data was reviewed by: GUILHERME Dunn on 05/12/2022:  Results for orders placed or performed in visit on 05/12/22   POC Urinalysis Dipstick, Automated    Specimen: Urine   Result Value Ref Range    Color Yellow Yellow, Straw, Dark Yellow, Irma    Clarity, UA Clear Clear    Specific Gravity  1.020 1.005 - 1.030    pH, Urine 5.5 5.0 - 8.0    Leukocytes Negative Negative    Nitrite, UA Negative Negative    Protein, POC Negative Negative mg/dL    Glucose, UA Negative Negative, 1000 mg/dL (3+) mg/dL    Ketones, UA Negative Negative    Urobilinogen, UA Normal Normal    Bilirubin Negative Negative    Blood, UA Negative Negative    Lot Number [id number]     Expiration Date 2-2,023             Procedures        Assessment and Plan    Diagnoses and all orders for this visit:    1. Prostate nodule (Primary)  -     POC Urinalysis Dipstick, Automated    2. Penile lump    Discussed with patient at this point in time given his palpable penile lump I would like to get him scheduled for an MRI of the pelvis to delineate the etiology of this.  " I would also like to get him scheduled for a cystoscopy to ensure this is not within the urethra.    Patient states that he does not wish to proceed with an MRI at this point in time because he has just had an MRI of his prostate and does not wish to pay for the MRI.  Did discuss with him that moving forward with a cystoscopy may not provide any answers to his issue and we may still end up needing an MRI however patient would like to proceed with a cystoscopy first.    I spent 10  minutes caring for Arash on this date of service. This time includes time spent by me in the following activities:reviewing tests, obtaining and/or reviewing a separately obtained history, performing a medically appropriate examination and/or evaluation , counseling and educating the patient/family/caregiver, ordering medications, tests, or procedures, and documenting information in the medical record  Follow Up   No follow-ups on file.  Patient was given instructions and counseling regarding his condition or for health maintenance advice. Please see specific information pulled into the AVS if appropriate.         This document has been electronically signed by GUILHERME Dunn  May 13, 2022 12:26 EDT

## 2022-06-20 ENCOUNTER — TELEPHONE (OUTPATIENT)
Dept: UROLOGY | Facility: CLINIC | Age: 42
End: 2022-06-20

## 2022-06-22 NOTE — TELEPHONE ENCOUNTER
3RD ATTEMPT LVM FOR PT TO CALL OFFICE BACK TO R/S CX APPT FROM 6/27 WITH CARROLL FOR A CYSTO/MS...ANYTHING ELSE TO DO?

## 2022-09-16 ENCOUNTER — PROCEDURE VISIT (OUTPATIENT)
Dept: UROLOGY | Facility: CLINIC | Age: 42
End: 2022-09-16

## 2022-09-16 VITALS — BODY MASS INDEX: 33.96 KG/M2 | HEIGHT: 77 IN | RESPIRATION RATE: 16 BRPM | WEIGHT: 287.6 LBS

## 2022-09-16 DIAGNOSIS — N48.89 PENILE LUMP: ICD-10-CM

## 2022-09-16 DIAGNOSIS — N40.2 PROSTATE NODULE: Primary | ICD-10-CM

## 2022-09-16 PROCEDURE — 81003 URINALYSIS AUTO W/O SCOPE: CPT | Performed by: UROLOGY

## 2022-09-16 PROCEDURE — 52000 CYSTOURETHROSCOPY: CPT | Performed by: UROLOGY

## 2022-09-16 NOTE — PROGRESS NOTES
Cystoscopy    Date/Time: 9/16/2022 11:51 AM  Performed by: Kayla Garcia MD  Authorized by: Kayla Garcia MD   Preparation: Patient was prepped and draped in the usual sterile fashion.  Local anesthesia used: no    Anesthesia:  Local anesthesia used: no    Sedation:  Patient sedated: no    Patient tolerance: patient tolerated the procedure well with no immediate complications  Comments: Cytoscopy Procedure:     Procedure: Flexible cytoscope was passed per urethra into the bladder without difficulty after proper consent. The bladder was inspected in a systematic meridian fashion. There were no tumors, lesions, stones, or other abnormalities noted within the bladder. Of note, there was no increased vascularity as well. Both ureteral orifices were identified and were normal in appearance. The flexible cytoscope was removed. The patient tolerated the procedure well.     FOLLOW UP OFFICE NOTE: The MRI of the prostate a year ago was normal other than chronic prostatitis.

## 2022-11-09 ENCOUNTER — TELEPHONE (OUTPATIENT)
Dept: UROLOGY | Facility: CLINIC | Age: 42
End: 2022-11-09

## 2022-11-09 DIAGNOSIS — N48.89 PENILE MASS: Primary | ICD-10-CM

## 2022-11-09 NOTE — TELEPHONE ENCOUNTER
Left message for PT asking that he call back to let us know what imaging he's actually requesting.

## 2022-11-09 NOTE — TELEPHONE ENCOUNTER
Pt called back. He is requesting an MRI of the penile area. He said that since the scope that Dr Garcia did things have changed pretty quickly. The lump that he had has gotten much larger and it is causing erectile problems among other issues.

## 2022-11-09 NOTE — TELEPHONE ENCOUNTER
Informed PT that I scheduled his MRI at the hospital for November 14 at 7:30 with an arrival time of 7:00. I informed him that it was dependent on authorization from an insurance and that if it wasn't approved then it would have to be postponed along with his follow up appointment. I confirmed that he had no metal in his body, no implants and that he was not on dialysis. I instructed him to not wear any metal or jewelry to the appointment. He verbalized understanding.

## 2022-11-09 NOTE — TELEPHONE ENCOUNTER
Caller: Arash Torres    Relationship: Self    Best call back number: [phone]    What orders are you requesting (i.e. lab or imaging): IMAGING    In what timeframe would the patient need to come in: BEFORE APPT 11/16 1:45P    Where will you receive your lab/imaging services: [facility]     Additional notes: WOULD LIKE CALL BACK

## 2022-11-14 ENCOUNTER — HOSPITAL ENCOUNTER (OUTPATIENT)
Dept: MRI IMAGING | Facility: HOSPITAL | Age: 42
Discharge: HOME | End: 2022-11-14
Admitting: UROLOGY

## 2022-11-14 DIAGNOSIS — N48.89 PENILE MASS: ICD-10-CM

## 2022-11-14 PROCEDURE — 0 GADOBENATE DIMEGLUMINE 529 MG/ML SOLUTION: Performed by: UROLOGY

## 2022-11-14 PROCEDURE — 72197 MRI PELVIS W/O & W/DYE: CPT

## 2022-11-14 PROCEDURE — A9577 INJ MULTIHANCE: HCPCS | Performed by: UROLOGY

## 2022-11-14 RX ADMIN — GADOBENATE DIMEGLUMINE 20 ML: 529 INJECTION, SOLUTION INTRAVENOUS at 08:35

## 2022-11-21 ENCOUNTER — TELEPHONE (OUTPATIENT)
Dept: UROLOGY | Facility: CLINIC | Age: 42
End: 2022-11-21

## 2022-11-21 NOTE — TELEPHONE ENCOUNTER
Pt had MRI.  He was given results. However; when he looked at his results online it states something about a hernia.  He would like a further explanation.

## 2022-11-21 NOTE — TELEPHONE ENCOUNTER
PT called back and I informed that the hernia was nothing to be concerned about unless it was causing him any issues or pain and that if it is, he can follow up with his primary care physician. If his primary care physician feels he needs a general surgery consult then he can order that for him. He voiced understanding.

## 2022-11-21 NOTE — TELEPHONE ENCOUNTER
Left message that Dr. Garcia reviewed his MRI report again and said that the hernia was nothing to be concerned about unless it was causing him any issues or pain and that if it is, he can follow up with his primary care physician. If his primary care physician feels he needs a general surgery consult then he can order that for him.

## 2023-02-20 ENCOUNTER — LAB (OUTPATIENT)
Dept: LAB | Facility: HOSPITAL | Age: 43
End: 2023-02-20
Payer: COMMERCIAL

## 2023-02-20 ENCOUNTER — TELEPHONE (OUTPATIENT)
Dept: UROLOGY | Facility: CLINIC | Age: 43
End: 2023-02-20
Payer: COMMERCIAL

## 2023-02-20 DIAGNOSIS — N30.01 ACUTE CYSTITIS WITH HEMATURIA: Primary | ICD-10-CM

## 2023-02-20 DIAGNOSIS — N30.01 ACUTE CYSTITIS WITH HEMATURIA: ICD-10-CM

## 2023-02-20 LAB
BACTERIA UR QL AUTO: NORMAL /HPF
BILIRUB UR QL STRIP: NEGATIVE
CLARITY UR: CLEAR
COLOR UR: YELLOW
GLUCOSE UR STRIP-MCNC: NEGATIVE MG/DL
HGB UR QL STRIP.AUTO: NEGATIVE
HYALINE CASTS UR QL AUTO: NORMAL /LPF
KETONES UR QL STRIP: NEGATIVE
LEUKOCYTE ESTERASE UR QL STRIP.AUTO: NEGATIVE
NITRITE UR QL STRIP: NEGATIVE
PH UR STRIP.AUTO: 6 [PH] (ref 5–8)
PROT UR QL STRIP: NEGATIVE
RBC # UR STRIP: NORMAL /HPF
REF LAB TEST METHOD: NORMAL
SP GR UR STRIP: 1.02 (ref 1–1.03)
SQUAMOUS #/AREA URNS HPF: NORMAL /HPF
UROBILINOGEN UR QL STRIP: NORMAL
WBC # UR STRIP: NORMAL /HPF

## 2023-02-20 PROCEDURE — 87086 URINE CULTURE/COLONY COUNT: CPT

## 2023-02-20 PROCEDURE — 81001 URINALYSIS AUTO W/SCOPE: CPT

## 2023-02-20 NOTE — TELEPHONE ENCOUNTER
Per Kellee, we added a urinalysis to the culture.  I spoke to bridgett in the lab to do the add on.

## 2023-02-20 NOTE — TELEPHONE ENCOUNTER
Pt called back, he said that he is now having blood with urination. This has just started up again. Dr Garcia asked that we send this to you. Please call the patient for more details.

## 2023-02-20 NOTE — TELEPHONE ENCOUNTER
Patient said he had a right indirect inguinal hernia repair by Dr. Abdul at [facility] on 02/03/23.      Patient said on Thursday, he started having blood dripping from his penis.  He has urinary frequency, and pain directly below his penis, above his testicles.    He said Dr. Abdul told him to call urologist for appointment.

## 2023-02-21 LAB — BACTERIA SPEC AEROBE CULT: NO GROWTH

## 2023-02-22 ENCOUNTER — TELEPHONE (OUTPATIENT)
Dept: UROLOGY | Facility: CLINIC | Age: 43
End: 2023-02-22
Payer: COMMERCIAL

## 2023-02-22 DIAGNOSIS — N41.1 CHRONIC PROSTATITIS: Primary | ICD-10-CM

## 2023-02-22 RX ORDER — SULFAMETHOXAZOLE AND TRIMETHOPRIM 800; 160 MG/1; MG/1
1 TABLET ORAL DAILY
Qty: 120 TABLET | Refills: 0 | Status: SHIPPED | OUTPATIENT
Start: 2023-02-22

## 2023-02-22 NOTE — TELEPHONE ENCOUNTER
Patient asked for Jose call him regarding the results of his urine test.  He saw them on My Chart, and said there was a note about antibiotics.    He wants to discuss with Jose.

## 2023-02-22 NOTE — TELEPHONE ENCOUNTER
Pt is willing to take antibiotic for prostatitis.  He says he has been several in the past and ask to put him on the best one to treat.  I did tell him you would check his chart and send in the best for his treatment.  Pharmacy has been confirmed.

## 2023-02-22 NOTE — TELEPHONE ENCOUNTER
I just need to know if the patient wants to go on antibiotics for prostatitis or not.  His urinalysis does not reveal any blood or signs of infection.  His pain may be related to the hernia repair.

## 2023-03-06 ENCOUNTER — OFFICE VISIT (OUTPATIENT)
Dept: UROLOGY | Facility: CLINIC | Age: 43
End: 2023-03-06
Payer: COMMERCIAL

## 2023-03-06 VITALS — HEIGHT: 77 IN | RESPIRATION RATE: 15 BRPM | WEIGHT: 287 LBS | BODY MASS INDEX: 33.89 KG/M2

## 2023-03-06 DIAGNOSIS — N41.1 CHRONIC PROSTATITIS: ICD-10-CM

## 2023-03-06 DIAGNOSIS — N48.89 PENILE LUMP: Primary | ICD-10-CM

## 2023-03-06 DIAGNOSIS — N48.6 PEYRONIE'S DISEASE: ICD-10-CM

## 2023-03-06 PROBLEM — K40.90 RIGHT INGUINAL HERNIA: Status: ACTIVE | Noted: 2023-01-09

## 2023-03-06 LAB
BILIRUB BLD-MCNC: NEGATIVE MG/DL
CLARITY, POC: CLEAR
COLOR UR: YELLOW
EXPIRATION DATE: NORMAL
GLUCOSE UR STRIP-MCNC: NEGATIVE MG/DL
KETONES UR QL: NEGATIVE
LEUKOCYTE EST, POC: NEGATIVE
Lab: NORMAL
NITRITE UR-MCNC: NEGATIVE MG/ML
PH UR: 6 [PH] (ref 5–8)
PROT UR STRIP-MCNC: NEGATIVE MG/DL
RBC # UR STRIP: NEGATIVE /UL
SP GR UR: 1.01 (ref 1–1.03)
UROBILINOGEN UR QL: NORMAL

## 2023-03-06 PROCEDURE — 81003 URINALYSIS AUTO W/O SCOPE: CPT | Performed by: NURSE PRACTITIONER

## 2023-03-06 PROCEDURE — 99214 OFFICE O/P EST MOD 30 MIN: CPT | Performed by: NURSE PRACTITIONER

## 2023-03-06 RX ORDER — MONTELUKAST SODIUM 10 MG/1
10 TABLET ORAL DAILY
COMMUNITY

## 2023-03-06 RX ORDER — LISINOPRIL 10 MG/1
1 TABLET ORAL DAILY
COMMUNITY
Start: 2023-01-03

## 2023-03-06 RX ORDER — LORATADINE 10 MG/1
1 CAPSULE, LIQUID FILLED ORAL DAILY
COMMUNITY

## 2023-03-06 RX ORDER — CYCLOBENZAPRINE HCL 10 MG
10 TABLET ORAL
COMMUNITY

## 2023-03-06 RX ORDER — ASCORBIC ACID 1000 MG
1 TABLET ORAL DAILY
COMMUNITY

## 2023-03-06 NOTE — PROGRESS NOTES
Chief Complaint: Blood in Urine (F/u)    Subjective         History of Present Illness  Arash Torres is a 42 y.o. male presents to [facility] to be seen for hematuria.    Patient with a history of chronic prostatitis.    The patient had a right inguinal hernia repair performed on 2/3/2023 the patient states that around 16 February he began having blood dripping from his penis with urinary frequency and pain directly below the penis and about the testicles and his general surgeon told him to call urology for an appointment.    Several days later the patient called back and informed that he had gross hematuria.    He states that he had blood that came out after urination.     He states this went on for 2 days.     He was with some right sided testicular pain for 2 weeks but this has resolved.     The patient has had a urinalysis with microscopy as well as urine culture that were all within normal limits does not reveal any blood.    Currently we have the patient on a 4-month regimen of Bactrim for chronic prostatitis flare.    He is having issues with erectile dysfunction.     He reports being only able to get about half the erection he did before.     He does have peyronie's dx by dr. Garcia.     The patient states that he feels as if he is walking differently after hernia repair and states he feels as if this has helped some of his issues he was having previously.    He is [facility] to see if we can get his MRI from 2013 to see if there is any mention of a hernia at that point in time.            Objective     Past Medical History:   Diagnosis Date   • Acromegaly (HCC)    • Allergies    • Anxiety    • Bipolar disorder (HCC)    • Depression    • Hemorrhoids    • High blood pressure    • Jaundice    • Migraine headache    • Pituitary abnormality (HCC)    • Prostate disorder        Past Surgical History:   Procedure Laterality Date   • APPENDECTOMY     •  "CHOLECYSTECTOMY     • ENDOSCOPY     • PITUITARY EXCISION  2006    pituitary tumor         Current Outpatient Medications:   •  cyclobenzaprine (FLEXERIL) 10 MG tablet, Take 1 tablet by mouth., Disp: , Rfl:   •  Ginkgo Biloba 40 MG tablet, Take 1 capsule by mouth Daily., Disp: , Rfl:   •  lisinopril (PRINIVIL,ZESTRIL) 10 MG tablet, Take 1 tablet by mouth Daily., Disp: , Rfl:   •  Loratadine 10 MG capsule, Take 1 capsule by mouth Daily., Disp: , Rfl:   •  montelukast (SINGULAIR) 10 MG tablet, Take 1 tablet by mouth Daily., Disp: , Rfl:   •  multivitamin with minerals tablet tablet, Take 1 tablet by mouth Daily., Disp: , Rfl:   •  sulfamethoxazole-trimethoprim (Bactrim DS) 800-160 MG per tablet, Take 1 tablet by mouth Daily., Disp: 120 tablet, Rfl: 0    Allergies   Allergen Reactions   • Penicillins Other (See Comments)     Been told since he was a child not to take        Family History   Problem Relation Age of Onset   • Stroke Mother    • Cancer Mother    • Diabetes Mother    • Breast cancer Mother         60s   • Diabetes Maternal Grandmother    • Diabetes Maternal Aunt    • Diabetes Maternal Uncle    • Breast cancer Other         50s       Social History     Socioeconomic History   • Marital status: Single   Tobacco Use   • Smoking status: Never   • Smokeless tobacco: Never   Vaping Use   • Vaping Use: Never used   Substance and Sexual Activity   • Alcohol use: Yes     Comment: drinks weekly   • Drug use: Never   • Sexual activity: Defer       Vital Signs:   Resp 15   Ht 195.6 cm (77\")   Wt 130 kg (287 lb)   BMI 34.03 kg/m²      Physical Exam     Result Review :   The following data was reviewed by: GUILHERME Dunn on 03/06/2023:  Results for orders placed or performed in visit on 03/06/23   POC Urinalysis Dipstick, Automated    Specimen: Urine   Result Value Ref Range    Color Yellow Yellow, Straw, Dark Yellow, Irma    Clarity, UA Clear Clear    Specific Gravity  1.010 1.005 - 1.030    pH, Urine 6.0 " 5.0 - 8.0    Leukocytes Negative Negative    Nitrite, UA Negative Negative    Protein, POC Negative Negative mg/dL    Glucose, UA Negative Negative mg/dL    Ketones, UA Negative Negative    Urobilinogen, UA Normal Normal, 0.2 E.U./dL    Bilirubin Negative Negative    Blood, UA Negative Negative    Lot Number [id number]     Expiration Date 4/2,024             Procedures        Assessment and Plan    Diagnoses and all orders for this visit:    1. Penile lump (Primary)  -     POC Urinalysis Dipstick, Automated    2. Chronic prostatitis    3. Peyronie's disease    Lengthy discussion about Peyronie's disease and treatment options.  Recommend patient take 400 international units of vitamin e daily as this has been shown anecdotally to help improve Peyronie's plaques.  May recommend [facility] for further evaluation and treatment if this persists.    Discussed with patient that I believe he should withhold starting PFPT again for 6 months post op to make sure he had had adequate time to heal.     I would like to have him complete 4 months of antibiotic therapy for chronic prostatitis as well to see if this further alleviates his perineal and testicular pain as well as as helping to alleviate some of his urinary issues.            I spent 30 minutes caring for Arash on this date of service. This time includes time spent by me in the following activities:reviewing tests, obtaining and/or reviewing a separately obtained history, performing a medically appropriate examination and/or evaluation , counseling and educating the patient/family/caregiver, ordering medications, tests, or procedures, and documenting information in the medical record  Follow Up   Return for Follow-up chronic prostatitis.  Patient was given instructions and counseling regarding his condition or for health maintenance advice. Please see specific information pulled into the AVS if appropriate.         This document has been electronically  signed by Kellee Prakash, APRN  March 6, 2023 13:23 EST

## 2023-08-04 ENCOUNTER — TELEPHONE (OUTPATIENT)
Dept: UROLOGY | Facility: CLINIC | Age: 43
End: 2023-08-04
Payer: COMMERCIAL

## 2023-10-17 ENCOUNTER — OFFICE VISIT (OUTPATIENT)
Dept: UROLOGY | Facility: CLINIC | Age: 43
End: 2023-10-17
Payer: COMMERCIAL

## 2023-10-17 VITALS
HEIGHT: 77 IN | HEART RATE: 64 BPM | BODY MASS INDEX: 33.89 KG/M2 | DIASTOLIC BLOOD PRESSURE: 79 MMHG | SYSTOLIC BLOOD PRESSURE: 139 MMHG | WEIGHT: 287 LBS

## 2023-10-17 DIAGNOSIS — N48.89 PENILE LUMP: Primary | ICD-10-CM

## 2023-10-17 DIAGNOSIS — N41.1 CHRONIC PROSTATITIS: ICD-10-CM

## 2023-10-17 DIAGNOSIS — N48.6 PEYRONIE'S DISEASE: ICD-10-CM

## 2023-10-17 NOTE — PROGRESS NOTES
Chief Complaint: Penile lump    Subjective         History of Present Illness  Arash Torres is a 43 y.o. male presents to [facility] to be seen for f/u prostatitis.    He states no change in peyronie's plaques.    He states he has been taking vitamin e for this. He has developed new plaques.    He states the curvature has been intermittently worse.    He has completed his course of antibiotics for chronic prostatitis.    He has eliezer using a penis pump and this had helped some with curvature and with erections.    He states that he used the pump but it caused some redness.    He states since stopping antibiotic and starting PFPT he has had some continued urgency, frequency, blood in the semen.    He states he has progressed in the PFPT program is is able to so more exercises without pain.    He states that the antibiotic did help to clear the semen though.    He states after his hernia repair he is doing better.    He is asking about a device to straighten the penis.       Previous:     Patient with a history of chronic prostatitis.    The patient had a right inguinal hernia repair performed on 2/3/2023 the patient states that around 16 February he began having blood dripping from his penis with urinary frequency and pain directly below the penis and about the testicles and his general surgeon told him to call urology for an appointment.    Several days later the patient called back and informed that he had gross hematuria.    He states that he had blood that came out after urination.     He states this went on for 2 days.     He was with some right sided testicular pain for 2 weeks but this has resolved.     The patient has had a urinalysis with microscopy as well as urine culture that were all within normal limits does not reveal any blood.    Currently we have the patient on a 4-month regimen of Bactrim for chronic prostatitis flare.    He is having issues with erectile dysfunction.      He reports being only able to get about half the erection he did before.     He does have peyronie's dx by dr. Garcia.     The patient states that he feels as if he is walking differently after hernia repair and states he feels as if this has helped some of his issues he was having previously.    He is requesting that [facility] to [facility] to see if we can get his MRI from 2013 to see if there is any mention of a hernia at that point in time.            Objective     Past Medical History:   Diagnosis Date    Acromegaly     Allergies     Anxiety     Bipolar disorder     Depression     Hemorrhoids     High blood pressure     Jaundice     Migraine headache     Pituitary abnormality     Prostate disorder        Past Surgical History:   Procedure Laterality Date    APPENDECTOMY      CHOLECYSTECTOMY      ENDOSCOPY      PITUITARY EXCISION  2006    pituitary tumor         Current Outpatient Medications:     cyclobenzaprine (FLEXERIL) 10 MG tablet, Take 1 tablet by mouth., Disp: , Rfl:     Ginkgo Biloba 40 MG tablet, Take 1 capsule by mouth Daily., Disp: , Rfl:     lisinopril (PRINIVIL,ZESTRIL) 10 MG tablet, Take 1 tablet by mouth Daily., Disp: , Rfl:     Loratadine 10 MG capsule, Take 1 capsule by mouth Daily., Disp: , Rfl:     montelukast (SINGULAIR) 10 MG tablet, Take 1 tablet by mouth Daily., Disp: , Rfl:     multivitamin with minerals tablet tablet, Take 1 tablet by mouth Daily., Disp: , Rfl:     Allergies   Allergen Reactions    Penicillins Other (See Comments)     Been told since he was a child not to take        Family History   Problem Relation Age of Onset    Stroke Mother     Cancer Mother     Diabetes Mother     Breast cancer Mother         60s    Diabetes Maternal Grandmother     Diabetes Maternal Aunt     Diabetes Maternal Uncle     Breast cancer Other         50s       Social History     Socioeconomic History    Marital status: Single   Tobacco Use    Smoking status: Never    Smokeless tobacco:  "Never   Vaping Use    Vaping Use: Never used   Substance and Sexual Activity    Alcohol use: Yes     Comment: drinks weekly    Drug use: Never    Sexual activity: Defer       Vital Signs:   /79 (BP Location: Right arm, Patient Position: Sitting, Cuff Size: Large Adult)   Pulse 64   Ht 195.6 cm (77\")   Wt 130 kg (287 lb)   BMI 34.03 kg/m²      Physical Exam     Result Review :   The following data was reviewed by: GUILHERME Dunn on 03/06/2023:  Results for orders placed or performed in visit on 03/06/23   POC Urinalysis Dipstick, Automated    Specimen: Urine   Result Value Ref Range    Color Yellow Yellow, Straw, Dark Yellow, Irma    Clarity, UA Clear Clear    Specific Gravity  1.010 1.005 - 1.030    pH, Urine 6.0 5.0 - 8.0    Leukocytes Negative Negative    Nitrite, UA Negative Negative    Protein, POC Negative Negative mg/dL    Glucose, UA Negative Negative mg/dL    Ketones, UA Negative Negative    Urobilinogen, UA Normal Normal, 0.2 E.U./dL    Bilirubin Negative Negative    Blood, UA Negative Negative    Lot Number [id number]     Expiration Date 4/2,024             Procedures        Assessment and Plan    Diagnoses and all orders for this visit:    1. Penile lump (Primary)    2. Chronic prostatitis    3. Peyronie's disease      We will have him continue the vitamin e he may consider [facility] and xaiflex injections     We will have him f/u in 6 months or sooner if needed he will call with any flares of prostatitis       I spent  10 minutes caring for Arash on this date of service. This time includes time spent by me in the following activities:reviewing tests, obtaining and/or reviewing a separately obtained history, performing a medically appropriate examination and/or evaluation , counseling and educating the patient/family/caregiver, ordering medications, tests, or procedures, and documenting information in the medical record  Follow Up   Return in about 6 months (around 4/17/2024) for f/u " peyronies and chronic prostatitis .  Patient was given instructions and counseling regarding his condition or for health maintenance advice. Please see specific information pulled into the AVS if appropriate.         This document has been electronically signed by GUILHERME Dunn  October 17, 2023 15:40 EDT

## 2024-04-18 ENCOUNTER — OFFICE VISIT (OUTPATIENT)
Dept: UROLOGY | Facility: CLINIC | Age: 44
End: 2024-04-18
Payer: COMMERCIAL

## 2024-04-18 VITALS
DIASTOLIC BLOOD PRESSURE: 90 MMHG | SYSTOLIC BLOOD PRESSURE: 143 MMHG | HEART RATE: 69 BPM | BODY MASS INDEX: 34.83 KG/M2 | HEIGHT: 77 IN | WEIGHT: 295 LBS

## 2024-04-18 DIAGNOSIS — N42.9 PROSTATE DISORDER: Primary | ICD-10-CM

## 2024-04-18 DIAGNOSIS — N48.6 PEYRONIE'S DISEASE: ICD-10-CM

## 2024-04-18 RX ORDER — LANCETS
1 EACH MISCELLANEOUS 3 TIMES DAILY
COMMUNITY
Start: 2024-03-18

## 2024-04-18 RX ORDER — BLOOD SUGAR DIAGNOSTIC
1 STRIP MISCELLANEOUS 3 TIMES DAILY
COMMUNITY
Start: 2024-03-18

## 2024-04-18 NOTE — PROGRESS NOTES
Chief Complaint: prostatitis (Pt states that he is doing better)    Subjective         History of Present Illness  Arash Torres is a 43 y.o. male presents to [facility] to be seen for f/u prostatitis/ peyronies.    He reports that his flow some days is great but other days may have issues with flow.    Semen is mostly clear.    He repots he is still healing from hernia repair.    Peyronie's plaques have improved.    Curvature has improved.     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    He states that the middle plaque has resolved.     Libido has improved.       Previous:   He states no change in peyronie's plaques.    He states he has been taking vitamin e for this. He has developed new plaques.    He states the curvature has been intermittently worse.    He has completed his course of antibiotics for chronic prostatitis.    He has eliezer using a penis pump and this had helped some with curvature and with erections.    He states that he used the pump but it caused some redness.    He states since stopping antibiotic and starting PFPT he has had some continued urgency, frequency, blood in the semen.    He states he has progressed in the PFPT program is is able to so more exercises without pain.    He states that the antibiotic did help to clear the semen though.    He states after his hernia repair he is doing better.    He is asking about a device to straighten the penis.     Patient with a history of chronic prostatitis.    The patient had a right inguinal hernia repair performed on 2/3/2023 the patient states that around 16 February he began having blood dripping from his penis with urinary frequency and pain directly below the penis and about the testicles and his general surgeon told him to call urology for an appointment.    Several days later the patient called back and informed that he had gross hematuria.    He states that he had blood that came out after urination.     He  states this went on for 2 days.     He was with some right sided testicular pain for 2 weeks but this has resolved.     The patient has had a urinalysis with microscopy as well as urine culture that were all within normal limits does not reveal any blood.    Currently we have the patient on a 4-month regimen of Bactrim for chronic prostatitis flare.    He is having issues with erectile dysfunction.     He reports being only able to get about half the erection he did before.     He does have peyronie's dx by dr. Garcia.     The patient states that he feels as if he is walking differently after hernia repair and states he feels as if this has helped some of his issues he was having previously.    He is [facility] see if we can get his MRI from 2013 to see if there is any mention of a hernia at that point in time.            Objective     Past Medical History:   Diagnosis Date    Acromegaly     Allergies     Anxiety     Bipolar disorder     Depression     Hemorrhoids     High blood pressure     Jaundice     Migraine headache     Pituitary abnormality     Prostate disorder        Past Surgical History:   Procedure Laterality Date    APPENDECTOMY      CHOLECYSTECTOMY      ENDOSCOPY      PITUITARY EXCISION  2006    pituitary tumor         Current Outpatient Medications:     Accu-Chek Guide test strip, 1 each by Other route 3 (Three) Times a Day., Disp: , Rfl:     Accu-Chek Softclix Lancets lancets, 1 each by Other route 3 (Three) Times a Day., Disp: , Rfl:     cyclobenzaprine (FLEXERIL) 10 MG tablet, Take 1 tablet by mouth., Disp: , Rfl:     Ginkgo Biloba 40 MG tablet, Take 1 capsule by mouth Daily., Disp: , Rfl:     lisinopril (PRINIVIL,ZESTRIL) 10 MG tablet, Take 1 tablet by mouth Daily., Disp: , Rfl:     Loratadine 10 MG capsule, Take 1 capsule by mouth Daily., Disp: , Rfl:     montelukast (SINGULAIR) 10 MG tablet, Take 1 tablet by mouth Daily., Disp: , Rfl:     multivitamin with  "minerals tablet tablet, Take 1 tablet by mouth Daily., Disp: , Rfl:     Allergies   Allergen Reactions    Penicillins Other (See Comments)     Been told since he was a child not to take        Family History   Problem Relation Age of Onset    Stroke Mother     Cancer Mother     Diabetes Mother     Breast cancer Mother         60s    Diabetes Maternal Grandmother     Diabetes Maternal Aunt     Diabetes Maternal Uncle     Breast cancer Other         50s       Social History     Socioeconomic History    Marital status: Single   Tobacco Use    Smoking status: Never     Passive exposure: Never    Smokeless tobacco: Never   Vaping Use    Vaping status: Never Used   Substance and Sexual Activity    Alcohol use: Yes     Comment: drinks weekly    Drug use: Never    Sexual activity: Defer       Vital Signs:   /90 (BP Location: Left arm, Patient Position: Sitting, Cuff Size: Large Adult)   Pulse 69   Ht 195.6 cm (77\")   Wt 134 kg (295 lb)   BMI 34.98 kg/m²      Physical Exam     Result Review :   The following data was reviewed by: GUILHERME Dunn on 03/06/2023:  Results for orders placed or performed in visit on 03/06/23   POC Urinalysis Dipstick, Automated    Specimen: Urine   Result Value Ref Range    Color Yellow Yellow, Straw, Dark Yellow, Irma    Clarity, UA Clear Clear    Specific Gravity  1.010 1.005 - 1.030    pH, Urine 6.0 5.0 - 8.0    Leukocytes Negative Negative    Nitrite, UA Negative Negative    Protein, POC Negative Negative mg/dL    Glucose, UA Negative Negative mg/dL    Ketones, UA Negative Negative    Urobilinogen, UA Normal Normal, 0.2 E.U./dL    Bilirubin Negative Negative    Blood, UA Negative Negative    Lot Number [id number]     Expiration Date 4/2,024             Procedures        Assessment and Plan    Diagnoses and all orders for this visit:    1. Prostate disorder (Primary)    2. Peyronie's disease        He is doing better at this time.    We did discuss Vyleesi as a potential " treatment for Peyronie's and ED patient is going to look up information on this and see if this is something he is willing to try.    We will ha possiblyve him f/u in 12 or sooner if needed he will call with any flares of prostatitis.          I spent  10 minutes caring for Arash on this date of service. This time includes time spent by me in the following activities:reviewing tests, obtaining and/or reviewing a separately obtained history, performing a medically appropriate examination and/or evaluation , counseling and educating the patient/family/caregiver, ordering medications, tests, or procedures, and documenting information in the medical record  Follow Up   Return in about 1 year (around 4/18/2025) for f/u prostatitis/ peyronies .  Patient was given instructions and counseling regarding his condition or for health maintenance advice. Please see specific information pulled into the AVS if appropriate.         This document has been electronically signed by GUILHERME Dunn  April 18, 2024 14:35 EDT

## 2025-04-24 NOTE — PROGRESS NOTES
"Chief Complaint   Patient presents with    Abdominal Pain         History of Present Illness  Patient is a 44-year-old male who presents today for Evaluation, referred for abdominal pain and abnormal appetite.  EGD in 2019 showed a small hiatal hernia.    Patient presents today for evaluation.  Reports starting in June 2024 he began experiencing indigestion and, heartburn, and reflux.  At this time he was under increased work stress.  He treated with Tums at the time.    In November he had an episode where he had vision changes, felt dizzy and unwell.  He saw his primary care provider who did an MRI to follow-up on his history of acromegaly and make sure he had not had any return of pituitary tumor which she had not.    He question if his GI symptoms and this were related and felt he may have had gastritis or gastric vertigo.  He started Nexium and Pepcid which did help with the symptoms and then was following a bland diet.    He then advance his diet and ate 20 meatballs and had an episode again where he felt very dizzy, lightheaded, flushed, and his hands were turning blue.    He was experiencing epigastric pain and nausea but no vomiting.  The pain has improved since starting the Nexium.    Over this time he has lost around 30 pounds, he attributes this to modifying his diet and taking Wellbutrin which has suppressed his appetite.    His bowels are moving regularly.  Denies any blood in the stool.  Denies any family history of any GI disorders.      Result Review :       LABS SCANNED (11/26/2024)    ENDOSCOPY, INT (10/04/2019)    Referral to Gastroenterology for Abnormal food appetite; Abdominal pain; Encounter for screening for upper gastrointestinal disorder (02/12/2025)    Vital Signs:   /92   Pulse 61   Temp 97.8 °F (36.6 °C)   Ht 195.6 cm (77\")   Wt 121 kg (266 lb 1.6 oz)   SpO2 93%   BMI 31.55 kg/m²     Body mass index is 31.55 kg/m².     Physical Exam  Vitals reviewed.   Constitutional:       " General: He is not in acute distress.     Appearance: He is well-developed.   HENT:      Head: Normocephalic and atraumatic.   Pulmonary:      Effort: Pulmonary effort is normal. No respiratory distress.   Abdominal:      General: Abdomen is flat. Bowel sounds are normal. There is no distension.      Palpations: Abdomen is soft.      Tenderness: There is no abdominal tenderness.   Skin:     General: Skin is dry.      Coloration: Skin is not pale.   Neurological:      Mental Status: He is alert and oriented to person, place, and time.   Psychiatric:         Thought Content: Thought content normal.           Assessment and Plan    Diagnoses and all orders for this visit:    1. Epigastric pain (Primary)  -     CT Abdomen Pelvis With Contrast; Future    2. Nausea  -     CT Abdomen Pelvis With Contrast; Future    3. Gastroesophageal reflux disease, unspecified whether esophagitis present         Discussion  Patient presents today with concerns about epigastric pain, nausea, reflux and has also been experiencing dizziness.  Unclear if dizziness is related to GI symptoms but discussed this could potentially be a vagal response.  Recommended EGD to assess for any evidence of peptic ulcer disease, gastritis, H. pylori infection, or esophagitis that could be contributing to symptoms and we will schedule CT scan to evaluate for any evidence of intra-abdominal lesion that could contribute to symptoms.  If EGD and CT are negative we could consider gastric emptying study if symptoms persist if GI workup is negative may need to consider neurology or cardiology consultations.          Follow Up   Return for Follow up to review results after testing complete.    Patient Instructions   Schedule EGD for further evaluation of symptoms.     Schedule CT scan for further evaluation.

## 2025-04-25 ENCOUNTER — OFFICE VISIT (OUTPATIENT)
Dept: GASTROENTEROLOGY | Facility: CLINIC | Age: 45
End: 2025-04-25
Payer: COMMERCIAL

## 2025-04-25 ENCOUNTER — PREP FOR SURGERY (OUTPATIENT)
Dept: SURGERY | Facility: SURGERY CENTER | Age: 45
End: 2025-04-25
Payer: COMMERCIAL

## 2025-04-25 VITALS
DIASTOLIC BLOOD PRESSURE: 92 MMHG | TEMPERATURE: 97.8 F | BODY MASS INDEX: 31.42 KG/M2 | SYSTOLIC BLOOD PRESSURE: 140 MMHG | HEIGHT: 77 IN | WEIGHT: 266.1 LBS | HEART RATE: 61 BPM | OXYGEN SATURATION: 93 %

## 2025-04-25 DIAGNOSIS — R11.0 NAUSEA: ICD-10-CM

## 2025-04-25 DIAGNOSIS — R10.13 EPIGASTRIC PAIN: Primary | ICD-10-CM

## 2025-04-25 DIAGNOSIS — K21.9 GASTROESOPHAGEAL REFLUX DISEASE, UNSPECIFIED WHETHER ESOPHAGITIS PRESENT: ICD-10-CM

## 2025-04-25 PROCEDURE — 99214 OFFICE O/P EST MOD 30 MIN: CPT | Performed by: NURSE PRACTITIONER

## 2025-04-25 RX ORDER — SODIUM CHLORIDE 0.9 % (FLUSH) 0.9 %
10 SYRINGE (ML) INJECTION AS NEEDED
OUTPATIENT
Start: 2025-04-25

## 2025-04-25 RX ORDER — SODIUM CHLORIDE, SODIUM LACTATE, POTASSIUM CHLORIDE, CALCIUM CHLORIDE 600; 310; 30; 20 MG/100ML; MG/100ML; MG/100ML; MG/100ML
30 INJECTION, SOLUTION INTRAVENOUS CONTINUOUS PRN
OUTPATIENT
Start: 2025-04-25 | End: 2025-04-25

## 2025-04-25 RX ORDER — BUPROPION HYDROCHLORIDE 300 MG/1
300 TABLET ORAL EVERY MORNING
COMMUNITY
Start: 2025-03-10

## 2025-04-25 RX ORDER — CETIRIZINE HYDROCHLORIDE 10 MG/1
10 TABLET ORAL DAILY
COMMUNITY

## 2025-04-25 RX ORDER — SODIUM CHLORIDE 0.9 % (FLUSH) 0.9 %
3 SYRINGE (ML) INJECTION EVERY 12 HOURS SCHEDULED
OUTPATIENT
Start: 2025-04-25

## 2025-05-13 ENCOUNTER — HOSPITAL ENCOUNTER (OUTPATIENT)
Dept: CT IMAGING | Facility: HOSPITAL | Age: 45
Discharge: HOME | End: 2025-05-13
Admitting: NURSE PRACTITIONER
Payer: COMMERCIAL

## 2025-05-13 DIAGNOSIS — R10.13 EPIGASTRIC PAIN: ICD-10-CM

## 2025-05-13 DIAGNOSIS — R11.0 NAUSEA: ICD-10-CM

## 2025-05-13 PROCEDURE — 74177 CT ABD & PELVIS W/CONTRAST: CPT

## 2025-05-13 PROCEDURE — 25510000001 IOPAMIDOL PER 1 ML: Performed by: NURSE PRACTITIONER

## 2025-05-13 RX ORDER — IOPAMIDOL 755 MG/ML
100 INJECTION, SOLUTION INTRAVASCULAR
Status: COMPLETED | OUTPATIENT
Start: 2025-05-13 | End: 2025-05-13

## 2025-05-13 RX ADMIN — IOPAMIDOL 100 ML: 755 INJECTION, SOLUTION INTRAVENOUS at 08:49

## 2025-06-26 ENCOUNTER — HOSPITAL ENCOUNTER (OUTPATIENT)
Facility: SURGERY CENTER | Age: 45
Setting detail: HOSPITAL OUTPATIENT SURGERY
Discharge: HOME | End: 2025-06-26
Attending: INTERNAL MEDICINE | Admitting: INTERNAL MEDICINE
Payer: COMMERCIAL

## 2025-06-26 ENCOUNTER — ANESTHESIA EVENT (OUTPATIENT)
Dept: SURGERY | Facility: SURGERY CENTER | Age: 45
End: 2025-06-26
Payer: COMMERCIAL

## 2025-06-26 ENCOUNTER — ANESTHESIA (OUTPATIENT)
Dept: SURGERY | Facility: SURGERY CENTER | Age: 45
End: 2025-06-26
Payer: COMMERCIAL

## 2025-06-26 VITALS
HEIGHT: 77 IN | OXYGEN SATURATION: 98 % | DIASTOLIC BLOOD PRESSURE: 88 MMHG | SYSTOLIC BLOOD PRESSURE: 141 MMHG | TEMPERATURE: 98.2 F | WEIGHT: 244.4 LBS | BODY MASS INDEX: 28.86 KG/M2 | HEART RATE: 70 BPM | RESPIRATION RATE: 18 BRPM

## 2025-06-26 DIAGNOSIS — R11.0 NAUSEA: ICD-10-CM

## 2025-06-26 DIAGNOSIS — R10.13 EPIGASTRIC PAIN: ICD-10-CM

## 2025-06-26 PROCEDURE — 25010000002 LIDOCAINE 2% SOLUTION: Performed by: NURSE ANESTHETIST, CERTIFIED REGISTERED

## 2025-06-26 PROCEDURE — 43239 EGD BIOPSY SINGLE/MULTIPLE: CPT | Performed by: INTERNAL MEDICINE

## 2025-06-26 PROCEDURE — 25010000002 PROPOFOL 10 MG/ML EMULSION: Performed by: NURSE ANESTHETIST, CERTIFIED REGISTERED

## 2025-06-26 PROCEDURE — 88305 TISSUE EXAM BY PATHOLOGIST: CPT | Performed by: INTERNAL MEDICINE

## 2025-06-26 PROCEDURE — 25810000003 LACTATED RINGERS PER 1000 ML: Performed by: NURSE PRACTITIONER

## 2025-06-26 RX ORDER — PROMETHAZINE HYDROCHLORIDE 25 MG/1
25 SUPPOSITORY RECTAL ONCE AS NEEDED
Status: DISCONTINUED | OUTPATIENT
Start: 2025-06-26 | End: 2025-06-26 | Stop reason: HOSPADM

## 2025-06-26 RX ORDER — SODIUM CHLORIDE 0.9 % (FLUSH) 0.9 %
3 SYRINGE (ML) INJECTION EVERY 12 HOURS SCHEDULED
Status: DISCONTINUED | OUTPATIENT
Start: 2025-06-26 | End: 2025-06-26 | Stop reason: HOSPADM

## 2025-06-26 RX ORDER — PROMETHAZINE HYDROCHLORIDE 12.5 MG/1
25 TABLET ORAL ONCE AS NEEDED
Status: DISCONTINUED | OUTPATIENT
Start: 2025-06-26 | End: 2025-06-26 | Stop reason: HOSPADM

## 2025-06-26 RX ORDER — SODIUM CHLORIDE 0.9 % (FLUSH) 0.9 %
10 SYRINGE (ML) INJECTION AS NEEDED
Status: DISCONTINUED | OUTPATIENT
Start: 2025-06-26 | End: 2025-06-26 | Stop reason: HOSPADM

## 2025-06-26 RX ORDER — SODIUM CHLORIDE, SODIUM LACTATE, POTASSIUM CHLORIDE, CALCIUM CHLORIDE 600; 310; 30; 20 MG/100ML; MG/100ML; MG/100ML; MG/100ML
30 INJECTION, SOLUTION INTRAVENOUS CONTINUOUS PRN
Status: ACTIVE | OUTPATIENT
Start: 2025-06-26 | End: 2025-06-26

## 2025-06-26 RX ORDER — BUSPIRONE HYDROCHLORIDE 7.5 MG/1
1 TABLET ORAL 3 TIMES DAILY
COMMUNITY
Start: 2025-06-06

## 2025-06-26 RX ORDER — LIDOCAINE HYDROCHLORIDE 20 MG/ML
INJECTION, SOLUTION INFILTRATION; PERINEURAL AS NEEDED
Status: DISCONTINUED | OUTPATIENT
Start: 2025-06-26 | End: 2025-06-26 | Stop reason: SURG

## 2025-06-26 RX ORDER — PROPOFOL 10 MG/ML
VIAL (ML) INTRAVENOUS AS NEEDED
Status: DISCONTINUED | OUTPATIENT
Start: 2025-06-26 | End: 2025-06-26 | Stop reason: SURG

## 2025-06-26 RX ORDER — SILDENAFIL 50 MG/1
1 TABLET, FILM COATED ORAL DAILY
COMMUNITY
Start: 2025-06-06

## 2025-06-26 RX ADMIN — SODIUM CHLORIDE, POTASSIUM CHLORIDE, SODIUM LACTATE AND CALCIUM CHLORIDE 30 ML/HR: 600; 310; 30; 20 INJECTION, SOLUTION INTRAVENOUS at 08:27

## 2025-06-26 RX ADMIN — PROPOFOL 50 MG: 10 INJECTION, EMULSION INTRAVENOUS at 09:22

## 2025-06-26 RX ADMIN — PROPOFOL 150 MG: 10 INJECTION, EMULSION INTRAVENOUS at 09:20

## 2025-06-26 RX ADMIN — PROPOFOL 300 MCG/KG/MIN: 10 INJECTION, EMULSION INTRAVENOUS at 09:20

## 2025-06-26 RX ADMIN — LIDOCAINE HYDROCHLORIDE 100 MG: 20 INJECTION, SOLUTION INFILTRATION; PERINEURAL at 09:20

## 2025-06-26 NOTE — H&P
No chief complaint on file.      HPI  Patient today for an EGD due to heartburn and epigastric pain.         Problem List:    Patient Active Problem List   Diagnosis    Acromegaly    Anxiety    Benign essential hypertension    Depression    Hemorrhoids    Herpes genitalis    High blood pressure    Jaundice    Other acute sinusitis    Pituitary abnormality    Prostate disorder    Sleep apnea    Chronic prostatitis    Penile lump    Right inguinal hernia    Epigastric pain    Nausea       Medical History:    Past Medical History:   Diagnosis Date    Acromegaly     Allergies     Anxiety     Bipolar disorder     Depression     Hemorrhoids     High blood pressure     Jaundice     Migraine headache     Pituitary abnormality     Prostate disorder         Social History:    Social History     Socioeconomic History    Marital status: Single   Tobacco Use    Smoking status: Never     Passive exposure: Never    Smokeless tobacco: Never   Vaping Use    Vaping status: Never Used   Substance and Sexual Activity    Alcohol use: Not Currently     Comment: drinks weekly    Drug use: Never    Sexual activity: Defer       Family History:   Family History   Problem Relation Age of Onset    Stroke Mother     Cancer Mother     Diabetes Mother     Breast cancer Mother         60s    Diabetes Maternal Aunt     Diabetes Maternal Uncle     Diabetes Maternal Grandmother     Breast cancer Other         50s    Colon cancer Neg Hx     Colon polyps Neg Hx     Crohn's disease Neg Hx     Irritable bowel syndrome Neg Hx     Ulcerative colitis Neg Hx        Surgical History:   Past Surgical History:   Procedure Laterality Date    APPENDECTOMY      CHOLECYSTECTOMY      ENDOSCOPY      PITUITARY EXCISION  2006    pituitary tumor    UPPER GASTROINTESTINAL ENDOSCOPY           Current Facility-Administered Medications:     lactated ringers infusion, 30 mL/hr, Intravenous, Continuous PRN, Clem, Ana G, APRN    sodium chloride 0.9 % flush 10 mL, 10 mL,  Intravenous, PRN, Clem, Ana G, APRN    sodium chloride 0.9 % flush 3 mL, 3 mL, Intravenous, Q12H, Clem, Ana G, APRN    Allergies:   Allergies   Allergen Reactions    Penicillins Other (See Comments)     Been told since he was a child not to take        The following portions of the patient's history were reviewed by me and updated as appropriate: review of systems, allergies, current medications, past family history, past medical history, past social history, past surgical history and problem list.    There were no vitals filed for this visit.    PHYSICAL EXAM:    CONSTITUTIONAL:  today's vital signs reviewed by me  GASTROINTESTINAL: abdomen is soft nontender nondistended with normal active bowel sounds, no masses are appreciated    Assessment/ Plan  Will proceed today with EGD.    Risks and benefits as well as alternatives to endoscopic evaluation were explained to the patient and they voiced understanding and wish to proceed.  These risks include but are not limited to the risk of bleeding, perforation, adverse reaction to sedation, and missed lesions.  The patient was given the opportunity to ask questions prior to the endoscopic procedure.

## 2025-06-26 NOTE — ANESTHESIA PREPROCEDURE EVALUATION
Anesthesia Evaluation     Patient summary reviewed and Nursing notes reviewed   history of anesthetic complications:  Difficult airway  NPO Solid Status: > 6 hours  NPO Liquid Status: > 2 hours           Airway   Mallampati: I  TM distance: >3 FB  Neck ROM: full  Difficult intubation highly probable  Dental - normal exam     Pulmonary    (+) ,sleep apnea  (-) COPD, asthma, not a smoker  Cardiovascular   Exercise tolerance: good (4-7 METS)    ECG reviewed    (+) hypertension  (-) past MI, dysrhythmias, angina      Neuro/Psych  (+) psychiatric history Anxiety and Depression  (-) seizures, CVA  GI/Hepatic/Renal/Endo    (-) GERD, liver disease, no renal disease, diabetes, no thyroid disorder    Musculoskeletal     Abdominal    Substance History      OB/GYN          Other          Other Comment: Acromegaly                    Anesthesia Plan    ASA 2     MAC       Anesthetic plan, risks, benefits, and alternatives have been provided, discussed and informed consent has been obtained with: patient.        CODE STATUS:

## 2025-06-26 NOTE — ANESTHESIA POSTPROCEDURE EVALUATION
Patient: Arash Torres    Procedure Summary       Date: 06/26/25 Room / Location: [facility]    Anesthesia Start: 0916 Anesthesia Stop: 0932    Procedure: ESOPHAGOGASTRODUODENOSCOPY with BXs Diagnosis:       Epigastric pain      Nausea      (Epigastric pain [R10.13])      (Nausea [R11.0])    Surgeons: Slade Alvarez MD Provider: Joshua Lopez MD    Anesthesia Type: MAC ASA Status: 2            Anesthesia Type: MAC    Vitals  Vitals Value Taken Time   /101 06/26/25 09:54   Temp 36.8 °C (98.2 °F) 06/26/25 09:30   Pulse 55 06/26/25 09:53   Resp 18 06/26/25 09:45   SpO2 100 % 06/26/25 09:53   Vitals shown include unfiled device data.        Post Anesthesia Care and Evaluation    Patient location during evaluation: PHASE II  Patient participation: complete - patient participated  Level of consciousness: awake  Pain management: satisfactory to patient    Airway patency: patent  Anesthetic complications: No anesthetic complications  PONV Status: controlled  Cardiovascular status: acceptable  Respiratory status: acceptable  Hydration status: acceptable

## 2025-06-27 LAB
CYTO UR: NORMAL
LAB AP CASE REPORT: NORMAL
PATH REPORT.FINAL DX SPEC: NORMAL
PATH REPORT.GROSS SPEC: NORMAL

## 2025-07-01 DIAGNOSIS — K22.70 BARRETT'S ESOPHAGUS WITHOUT DYSPLASIA: Primary | ICD-10-CM

## 2025-07-01 RX ORDER — ESOMEPRAZOLE MAGNESIUM 40 MG/1
40 CAPSULE, DELAYED RELEASE ORAL
Qty: 90 CAPSULE | Refills: 3 | Status: SHIPPED | OUTPATIENT
Start: 2025-07-01

## (undated) DEVICE — BLCK/BITE BLOX W/DENTL/RIM W/STRAP 54F

## (undated) DEVICE — ADAPT CLN SCPE ENDO PORPOISE BX/50 DISP

## (undated) DEVICE — KT ORCA ORCAPOD DISP STRL

## (undated) DEVICE — GOWN,SIRUS,NONRNF,3XL,18/CS: Brand: MEDLINE

## (undated) DEVICE — SINGLE-USE BIOPSY FORCEPS: Brand: RADIAL JAW 4

## (undated) DEVICE — MSK ENDO PORT O2 POM ELITE CURAPLEX A/

## (undated) DEVICE — VIAL FORMLN CAP 10PCT 20ML

## (undated) DEVICE — Device